# Patient Record
Sex: FEMALE | Race: BLACK OR AFRICAN AMERICAN | NOT HISPANIC OR LATINO | URBAN - METROPOLITAN AREA
[De-identification: names, ages, dates, MRNs, and addresses within clinical notes are randomized per-mention and may not be internally consistent; named-entity substitution may affect disease eponyms.]

---

## 2023-01-18 ENCOUNTER — OFFICE VISIT (OUTPATIENT)
Dept: URBAN - METROPOLITAN AREA CLINIC 96 | Facility: CLINIC | Age: 53
End: 2023-01-18

## 2023-11-17 ENCOUNTER — LAB OUTSIDE AN ENCOUNTER (OUTPATIENT)
Dept: URBAN - METROPOLITAN AREA CLINIC 50 | Facility: CLINIC | Age: 53
End: 2023-11-17

## 2023-11-17 ENCOUNTER — OFFICE VISIT (OUTPATIENT)
Dept: URBAN - METROPOLITAN AREA CLINIC 50 | Facility: CLINIC | Age: 53
End: 2023-11-17
Payer: SELF-PAY

## 2023-11-17 VITALS
TEMPERATURE: 97.7 F | HEART RATE: 65 BPM | HEIGHT: 66 IN | BODY MASS INDEX: 30.37 KG/M2 | WEIGHT: 189 LBS | SYSTOLIC BLOOD PRESSURE: 119 MMHG | DIASTOLIC BLOOD PRESSURE: 77 MMHG

## 2023-11-17 DIAGNOSIS — Z12.11 SCREENING FOR COLON CANCER: ICD-10-CM

## 2023-11-17 PROBLEM — 305058001: Status: ACTIVE | Noted: 2023-11-17

## 2023-11-17 PROCEDURE — 99202 OFFICE O/P NEW SF 15 MIN: CPT | Performed by: PHYSICIAN ASSISTANT

## 2023-11-17 PROCEDURE — 99242 OFF/OP CONSLTJ NEW/EST SF 20: CPT | Performed by: PHYSICIAN ASSISTANT

## 2023-11-17 RX ORDER — SODIUM, POTASSIUM,MAG SULFATES 17.5-3.13G
177ML SOLUTION, RECONSTITUTED, ORAL ORAL
Qty: 1 | Refills: 0 | OUTPATIENT
Start: 2023-11-17 | End: 2023-11-18

## 2023-11-17 NOTE — HPI-TODAY'S VISIT:
Patient is a 54 y/o female of Dr. Douglas who was recommended to discuss colonoscopy with our office for screening Also notes OBGYN recommended colonoscopy screening as well Denies prev colonoscopy No issues with stomach, no abdominal pains, nausea/vomiting, no constipation/diarrhea, no blood in stool or melena, no hx of anemia States last labs w PCP a couple weeks ago, no results yet No heartburn, no dysphagia No abnormal weight loss Denies famHx CRC/polyps BMs once daily, unchanged lately No cardiac, pulmonary, or kidney disease Can do stairs without problems

## 2024-01-09 ENCOUNTER — OUT OF OFFICE VISIT (OUTPATIENT)
Dept: URBAN - METROPOLITAN AREA SURGERY CENTER 18 | Facility: SURGERY CENTER | Age: 54
End: 2024-01-09
Payer: SELF-PAY

## 2024-01-09 ENCOUNTER — DASHBOARD ENCOUNTERS (OUTPATIENT)
Age: 54
End: 2024-01-09

## 2024-01-09 DIAGNOSIS — Z12.11 COLON CANCER SCREENING: ICD-10-CM

## 2024-01-09 DIAGNOSIS — Z12.11 COLON CANCER SCREENING (HIGH RISK): ICD-10-CM

## 2024-01-09 PROCEDURE — 00812 ANES LWR INTST SCR COLSC: CPT | Performed by: NURSE ANESTHETIST, CERTIFIED REGISTERED

## 2024-01-09 PROCEDURE — G0121 COLON CA SCRN NOT HI RSK IND: HCPCS | Performed by: INTERNAL MEDICINE

## 2024-01-09 PROCEDURE — G8907 PT DOC NO EVENTS ON DISCHARG: HCPCS | Performed by: INTERNAL MEDICINE

## 2024-01-09 PROCEDURE — 00811 ANES LWR INTST NDSC NOS: CPT | Performed by: NURSE ANESTHETIST, CERTIFIED REGISTERED
